# Patient Record
Sex: MALE | Race: WHITE | Employment: FULL TIME | URBAN - METROPOLITAN AREA
[De-identification: names, ages, dates, MRNs, and addresses within clinical notes are randomized per-mention and may not be internally consistent; named-entity substitution may affect disease eponyms.]

---

## 2024-09-13 ENCOUNTER — HOSPITAL ENCOUNTER (OUTPATIENT)
Dept: SLEEP MEDICINE | Age: 49
Discharge: HOME | End: 2024-09-16
Payer: COMMERCIAL

## 2024-09-13 PROCEDURE — 95811 POLYSOM 6/>YRS CPAP 4/> PARM: CPT

## 2024-09-23 ENCOUNTER — TELEPHONE (OUTPATIENT)
Dept: SLEEP MEDICINE | Age: 49
End: 2024-09-23

## 2024-10-04 ENCOUNTER — OFFICE VISIT (OUTPATIENT)
Dept: SLEEP MEDICINE | Age: 49
End: 2024-10-04

## 2024-10-04 VITALS
RESPIRATION RATE: 14 BRPM | BODY MASS INDEX: 42.66 KG/M2 | OXYGEN SATURATION: 97 % | HEART RATE: 71 BPM | WEIGHT: 315 LBS | SYSTOLIC BLOOD PRESSURE: 144 MMHG | DIASTOLIC BLOOD PRESSURE: 96 MMHG | HEIGHT: 72 IN

## 2024-10-04 DIAGNOSIS — G47.33 OSA TREATED WITH BIPAP: Primary | ICD-10-CM

## 2024-10-04 DIAGNOSIS — E66.01 MORBID OBESITY: ICD-10-CM

## 2024-10-04 DIAGNOSIS — G47.8 NON-RESTORATIVE SLEEP: ICD-10-CM

## 2024-10-04 DIAGNOSIS — G47.34 NOCTURNAL HYPOXEMIA: ICD-10-CM

## 2024-10-04 RX ORDER — SILDENAFIL CITRATE 20 MG/1
TABLET ORAL
COMMUNITY
Start: 2023-12-22

## 2024-10-04 RX ORDER — TIRZEPATIDE 2.5 MG/.5ML
2.5 INJECTION, SOLUTION SUBCUTANEOUS WEEKLY
COMMUNITY
Start: 2024-10-04

## 2024-10-04 RX ORDER — FEXOFENADINE HCL 180 MG/1
180 TABLET ORAL DAILY
COMMUNITY
Start: 2023-12-22

## 2024-10-04 RX ORDER — LOSARTAN POTASSIUM AND HYDROCHLOROTHIAZIDE 12.5; 1 MG/1; MG/1
1 TABLET ORAL DAILY
COMMUNITY
Start: 2024-07-26

## 2024-10-04 NOTE — PROGRESS NOTES
[facility]  [address]  [place] [address]  [phone]    Patient Name:  Zion Chavez  YOB: 1975      Office Visit 10/4/2024    CHIEF COMPLAINT:      Chief Complaint   Patient presents with    New Patient    Sleep Study    Results       HISTORY OF PRESENT ILLNESS:      The patient present for management of obstructive sleep apnea.    The patient underwent a recent diagnostic polysomnography because of symptoms including snoring, witnessed apnea, daytime fatigue and none restorative sleep.There is no history of cataplexy, hypnagogic hallucinations, or sleep paralysis.  In addition, there is no history of frequent leg movements, kicking at night, or an inability to keep the legs still.  He indicated he go to sleep around 10:30 PM and wake up at 4:30 AM to get ready for work at [employer] between 6 AM and 4 PM.  He had previously a sleep study back in 2004 in [id number] and has been using CPAP at 18 cm but his machine is broken beyond repair and needs replacement.  He had a split-night study.     The diagnostic polysomnography was notable for a respiratory disturbance index of 99.3/hour.  Oxygen desaturations are low as 73% were noted.  Significant cardiac arrhythmias were not evident.  The patient was noted to have frequent limb movements with a limb movement arousal index being about 4.6/hour.  A subsequent PAP titration study was conducted.  PAP levels as high as 22/18 cm were performed.  PAP was effective in eliminating disordered breathing.  BIPAP was tolerated fairly well by the patient.  The patient reports feeling better the day following.  I reviewed the study finding with him in detail and provided him a copy of that.  I discussed with him pathophysiology of sleep apnea as well.  He agreed to proceed with the BiPAP treatment as noted above.  The Lazbuddie score is 8/24.  The sleep health score is 6/12.      Sleepiness Scale:     Sitting and reading

## 2024-10-04 NOTE — PATIENT INSTRUCTIONS
sleep.  Eat regular meals and di not go to bed hungry. Hunger may disturb sleep.  A light snack at bedtime (especially carbohydrates) may help sleep, but avoid greasy or heavy foods.  Avoid excessive liquids in the evening. Reducing liquid intake will minimize the need for night-time trips to the bathroom.  Cut down on all caffeine products. Caffeinated beverages and food (Coffee, tea, cola, chocolate) can cause difficulty falling asleep, awakenings during the night, and shallow sleep.  Even caffeine early in the day can disrupt night-time sleep.  Avoid alcohol, especially in the evening. Although alcohol helps tense people fall asleep more easily, it causes awakenings later in the night.   Smoking may disturb sleep. Nicotine is a stimulant.  Try not to smoke during the night when you have trouble sleeping.     Learning about Sleeping Well    What does sleeping well mean?    Sleeping well means getting enough sleep.  How much sleep is enough varies among people.    The number of hours you sleep is not as improtant as how you feel when you wake up.  If you do not feel refreshed, you probably need more sleep.  Another sign of not getting enough sleep is feeling tired during the day.      The average totally nightly sleep time is 7 1/2 to 8 hours.  Healthy adults may need a little more or a little less than this.    Why is getting enough sleep important?    Getting enough quality sleep is a basic part of good health.  When your sleep suffers, your mood and your thoughts can suffer too.  Your thoughts can suffer too.  You ma find yourself feeling more grumpy or stressed.  No getting enough sleep also can lead to serious problems, including injury, accidents, anxiety, and depression.    What might cause poor sleeping?    Many things can cause sleep problems, including:    Stress.  Stress can be caused by fear about a single event, such as giving a speech.  Or you may have ongoing stress, such as worry about work or

## 2025-03-06 NOTE — PROGRESS NOTES
[facility]  [address]  [place] [address]  [phone]    Patient Name:  Zion Chavez  YOB: 1975      Office Visit 3/7/2025    CHIEF COMPLAINT:      Chief Complaint   Patient presents with    Follow-up    Sleep Apnea    CPAP/BiPAP       HISTORY OF PRESENT ILLNESS:      The patient present for management of obstructive sleep apnea.    To recap:    The patient underwent a recent diagnostic polysomnography which   was notable for a respiratory disturbance index of 99.3/hour.  Oxygen desaturations are low as 73% were noted.  Significant cardiac arrhythmias were not evident.  The patient was noted to have frequent limb movements with a limb movement arousal index being about 4.6/hour.  A subsequent PAP titration study was conducted.  PAP levels as high as 22/18 cm were performed.  PAP was effective in eliminating disordered breathing.  BIPAP was tolerated fairly well by the patient.  The patient reports feeling better the day following.      He indicated he go to sleep around 10:30 PM and wake up at 4:30 AM to get ready for work at [employer] between 6 AM and 4 PM.  He had previously a sleep study back in 2004 in [place] and has been using CPAP at 18 cm but his machine is broken beyond repair and needs replacement.        Today's visit:    Since his last office visit the patient was started on BiPAP at 22/18 cm using a fullface mask.  He is using and benefiting from his BiPAP on a daily basis and his download indicated 100% compliance.  His average daily use is 5 hours and 11 minutes.  His AHI is well-controlled at 1.9/hour.  His main problem is ongoing leak from his mask which is not fitting him properly.  He has a median leak of 19.1 L/min and 95th percentile leak of 77.4 L/min.  He indicated he continues to work at [employer] same hours and that hard to maintain adequate sleep duration.  I recommended ongoing adherence to the sleep hygiene.     The Stephens score is

## 2025-03-07 ENCOUNTER — OFFICE VISIT (OUTPATIENT)
Dept: SLEEP MEDICINE | Age: 50
End: 2025-03-07
Payer: COMMERCIAL

## 2025-03-07 VITALS
DIASTOLIC BLOOD PRESSURE: 86 MMHG | HEIGHT: 72 IN | SYSTOLIC BLOOD PRESSURE: 144 MMHG | WEIGHT: 315 LBS | BODY MASS INDEX: 42.66 KG/M2 | RESPIRATION RATE: 14 BRPM | HEART RATE: 76 BPM | OXYGEN SATURATION: 94 %

## 2025-03-07 DIAGNOSIS — G47.33 OSA TREATED WITH BIPAP: Primary | ICD-10-CM

## 2025-03-07 DIAGNOSIS — E66.01 MORBID OBESITY: ICD-10-CM

## 2025-03-07 DIAGNOSIS — G47.8 NON-RESTORATIVE SLEEP: ICD-10-CM

## 2025-03-07 DIAGNOSIS — G47.34 NOCTURNAL HYPOXEMIA: ICD-10-CM

## 2025-03-07 PROCEDURE — G2211 COMPLEX E/M VISIT ADD ON: HCPCS | Performed by: INTERNAL MEDICINE

## 2025-03-07 PROCEDURE — 99214 OFFICE O/P EST MOD 30 MIN: CPT | Performed by: INTERNAL MEDICINE

## 2025-03-07 ASSESSMENT — SLEEP AND FATIGUE QUESTIONNAIRES
HOW LIKELY ARE YOU TO NOD OFF OR FALL ASLEEP WHILE SITTING INACTIVE IN A PUBLIC PLACE: WOULD NEVER DOZE
HOW LIKELY ARE YOU TO NOD OFF OR FALL ASLEEP IN A CAR, WHILE STOPPED FOR A FEW MINUTES IN TRAFFIC: WOULD NEVER DOZE
HOW LIKELY ARE YOU TO NOD OFF OR FALL ASLEEP WHILE WATCHING TV: SLIGHT CHANCE OF DOZING
ESS TOTAL SCORE: 7
HOW LIKELY ARE YOU TO NOD OFF OR FALL ASLEEP WHILE SITTING AND READING: SLIGHT CHANCE OF DOZING
HOW LIKELY ARE YOU TO NOD OFF OR FALL ASLEEP WHILE SITTING QUIETLY AFTER LUNCH WITHOUT ALCOHOL: SLIGHT CHANCE OF DOZING
HOW LIKELY ARE YOU TO NOD OFF OR FALL ASLEEP WHEN YOU ARE A PASSENGER IN A CAR FOR AN HOUR WITHOUT A BREAK: SLIGHT CHANCE OF DOZING
HOW LIKELY ARE YOU TO NOD OFF OR FALL ASLEEP WHILE SITTING AND TALKING TO SOMEONE: WOULD NEVER DOZE
HOW LIKELY ARE YOU TO NOD OFF OR FALL ASLEEP WHILE LYING DOWN TO REST IN THE AFTERNOON WHEN CIRCUMSTANCES PERMIT: HIGH CHANCE OF DOZING